# Patient Record
Sex: MALE | Race: ASIAN | NOT HISPANIC OR LATINO | URBAN - METROPOLITAN AREA
[De-identification: names, ages, dates, MRNs, and addresses within clinical notes are randomized per-mention and may not be internally consistent; named-entity substitution may affect disease eponyms.]

---

## 2022-02-14 ENCOUNTER — INPATIENT (INPATIENT)
Facility: HOSPITAL | Age: 47
LOS: 1 days | Discharge: HOME | End: 2022-02-16
Attending: HOSPITALIST | Admitting: HOSPITALIST
Payer: MEDICAID

## 2022-02-14 VITALS
TEMPERATURE: 98 F | SYSTOLIC BLOOD PRESSURE: 123 MMHG | RESPIRATION RATE: 18 BRPM | OXYGEN SATURATION: 100 % | HEART RATE: 76 BPM | DIASTOLIC BLOOD PRESSURE: 89 MMHG

## 2022-02-14 LAB
ALBUMIN SERPL ELPH-MCNC: 4.3 G/DL — SIGNIFICANT CHANGE UP (ref 3.3–5)
ALP SERPL-CCNC: 55 U/L — SIGNIFICANT CHANGE UP (ref 40–120)
ALT FLD-CCNC: 37 U/L — SIGNIFICANT CHANGE UP (ref 4–41)
ANION GAP SERPL CALC-SCNC: 9 MMOL/L — SIGNIFICANT CHANGE UP (ref 7–14)
AST SERPL-CCNC: 28 U/L — SIGNIFICANT CHANGE UP (ref 4–40)
BASOPHILS # BLD AUTO: 0.02 K/UL — SIGNIFICANT CHANGE UP (ref 0–0.2)
BASOPHILS NFR BLD AUTO: 0.3 % — SIGNIFICANT CHANGE UP (ref 0–2)
BILIRUB SERPL-MCNC: 0.3 MG/DL — SIGNIFICANT CHANGE UP (ref 0.2–1.2)
BUN SERPL-MCNC: 15 MG/DL — SIGNIFICANT CHANGE UP (ref 7–23)
CALCIUM SERPL-MCNC: 9 MG/DL — SIGNIFICANT CHANGE UP (ref 8.4–10.5)
CHLORIDE SERPL-SCNC: 104 MMOL/L — SIGNIFICANT CHANGE UP (ref 98–107)
CO2 SERPL-SCNC: 26 MMOL/L — SIGNIFICANT CHANGE UP (ref 22–31)
CREAT SERPL-MCNC: 0.75 MG/DL — SIGNIFICANT CHANGE UP (ref 0.5–1.3)
EOSINOPHIL # BLD AUTO: 0.06 K/UL — SIGNIFICANT CHANGE UP (ref 0–0.5)
EOSINOPHIL NFR BLD AUTO: 1 % — SIGNIFICANT CHANGE UP (ref 0–6)
GLUCOSE SERPL-MCNC: 88 MG/DL — SIGNIFICANT CHANGE UP (ref 70–99)
HCT VFR BLD CALC: 44.7 % — SIGNIFICANT CHANGE UP (ref 39–50)
HGB BLD-MCNC: 15.7 G/DL — SIGNIFICANT CHANGE UP (ref 13–17)
IANC: 2.96 K/UL — SIGNIFICANT CHANGE UP (ref 1.5–8.5)
IMM GRANULOCYTES NFR BLD AUTO: 0.3 % — SIGNIFICANT CHANGE UP (ref 0–1.5)
LYMPHOCYTES # BLD AUTO: 2.41 K/UL — SIGNIFICANT CHANGE UP (ref 1–3.3)
LYMPHOCYTES # BLD AUTO: 39.1 % — SIGNIFICANT CHANGE UP (ref 13–44)
MCHC RBC-ENTMCNC: 30.4 PG — SIGNIFICANT CHANGE UP (ref 27–34)
MCHC RBC-ENTMCNC: 35.1 GM/DL — SIGNIFICANT CHANGE UP (ref 32–36)
MCV RBC AUTO: 86.5 FL — SIGNIFICANT CHANGE UP (ref 80–100)
MONOCYTES # BLD AUTO: 0.69 K/UL — SIGNIFICANT CHANGE UP (ref 0–0.9)
MONOCYTES NFR BLD AUTO: 11.2 % — SIGNIFICANT CHANGE UP (ref 2–14)
NEUTROPHILS # BLD AUTO: 2.96 K/UL — SIGNIFICANT CHANGE UP (ref 1.8–7.4)
NEUTROPHILS NFR BLD AUTO: 48.1 % — SIGNIFICANT CHANGE UP (ref 43–77)
NRBC # BLD: 0 /100 WBCS — SIGNIFICANT CHANGE UP
NRBC # FLD: 0 K/UL — SIGNIFICANT CHANGE UP
PLATELET # BLD AUTO: 216 K/UL — SIGNIFICANT CHANGE UP (ref 150–400)
POTASSIUM SERPL-MCNC: 3.9 MMOL/L — SIGNIFICANT CHANGE UP (ref 3.5–5.3)
POTASSIUM SERPL-SCNC: 3.9 MMOL/L — SIGNIFICANT CHANGE UP (ref 3.5–5.3)
PROT SERPL-MCNC: 6.8 G/DL — SIGNIFICANT CHANGE UP (ref 6–8.3)
RBC # BLD: 5.17 M/UL — SIGNIFICANT CHANGE UP (ref 4.2–5.8)
RBC # FLD: 11.6 % — SIGNIFICANT CHANGE UP (ref 10.3–14.5)
SODIUM SERPL-SCNC: 139 MMOL/L — SIGNIFICANT CHANGE UP (ref 135–145)
WBC # BLD: 6.16 K/UL — SIGNIFICANT CHANGE UP (ref 3.8–10.5)
WBC # FLD AUTO: 6.16 K/UL — SIGNIFICANT CHANGE UP (ref 3.8–10.5)

## 2022-02-14 PROCEDURE — 93010 ELECTROCARDIOGRAM REPORT: CPT

## 2022-02-14 PROCEDURE — 99285 EMERGENCY DEPT VISIT HI MDM: CPT | Mod: 25

## 2022-02-14 RX ORDER — DEXAMETHASONE 0.5 MG/5ML
10 ELIXIR ORAL ONCE
Refills: 0 | Status: COMPLETED | OUTPATIENT
Start: 2022-02-14 | End: 2022-02-14

## 2022-02-14 RX ORDER — DEXAMETHASONE 0.5 MG/5ML
10 ELIXIR ORAL ONCE
Refills: 0 | Status: DISCONTINUED | OUTPATIENT
Start: 2022-02-14 | End: 2022-02-14

## 2022-02-14 RX ORDER — MORPHINE SULFATE 50 MG/1
4 CAPSULE, EXTENDED RELEASE ORAL ONCE
Refills: 0 | Status: DISCONTINUED | OUTPATIENT
Start: 2022-02-14 | End: 2022-02-14

## 2022-02-14 RX ADMIN — Medication 10 MILLIGRAM(S): at 23:56

## 2022-02-14 RX ADMIN — MORPHINE SULFATE 4 MILLIGRAM(S): 50 CAPSULE, EXTENDED RELEASE ORAL at 21:47

## 2022-02-14 RX ADMIN — Medication 102 MILLIGRAM(S): at 22:47

## 2022-02-14 RX ADMIN — MORPHINE SULFATE 4 MILLIGRAM(S): 50 CAPSULE, EXTENDED RELEASE ORAL at 22:15

## 2022-02-14 NOTE — ED PROVIDER NOTE - OBJECTIVE STATEMENT
Dr Durbin  47yoM hx of chronic back pain pw lower leg weakness, right greater than left. pt endorse lower back pain x one week, taking meds at home without relief. Today went to see his neurologist, Dr Rosenbaum.   Dr Rosenbaum, with pt now, states he used lidocaine for a trigger injection. Injected on the left paraspinal area at 430pm, within ten min he developed weakness and numbness from the waist down. +weakness and numbness of both lower ext, unable to stand, Dr Rosenbaum helped pt. in the next two hours, the left leg improved. However the right leg continued to be weak and numb. Came to ED. no fever no trauma no n/v/d no abdominal pain no urinary or bowel incontinence Dr Durbin  47yoM hx of chronic back pain pw lower leg weakness, right greater than left. pt endorse lower back pain x one week, taking meds at home without relief. Today went to see his neurologist, Dr Rosenbaum.   Dr Rosenbaum, with pt now, states he used lidocaine for a trigger injection. Injected on the left paraspinal area at 430pm, within ten min he developed weakness and numbness from the waist down. +weakness and numbness of both lower ext, unable to stand, Dr Rosenbaum helped pt. in the next two hours, the left leg improved. However the right leg continued to be weak and numb. Came to ED. no fever no trauma no n/v/d no abdominal pain no urinary or bowel incontinence    Alexander PGY3: 48yo male with no pmh presenting with LE weakness s/p trigger point injection today.  Patient was at neurologists office for lbp for which he gets injections.  Dr. Rosenbaum, patient's neurologist at bedside.  Performed L paraspinal trigger point injection and shortly after patient began experiencing b/l LE weakness/ numbness.  Since procedure at 430p LLE with improvement in strength but RLE still weak/ numb.  Also notes difficulty urinating but not incontinent.  No fevers.

## 2022-02-14 NOTE — ED ADULT NURSE NOTE - OBJECTIVE STATEMENT
Pt. is a 47 y.o male who presents w/ c/o pain and weakness. Pt. A&Ox4 and ambulates independently at baseline. Currently unable to walk due to pain. Pt. states he had a sudden onset of low back pain w/ radiation to the rt. leg. Saw his neurologist and received a non-steroid injection in the low back. Endorsing 10/10 pain. MD aware. Limited ROM in rt. leg. Full ROM in lt. leg. #20g IV placed to rt. AC. Labs sent. Comfort measures provided, safety measures implemented, call bell in reach.

## 2022-02-14 NOTE — ED PROVIDER NOTE - PHYSICAL EXAMINATION
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   no work of breathing.   no midline spine tenderness along thoracic or lumbar spine. no swelling or discharge noted at injection site along the left paraspinal area.   soft nontender abdomen. no  rebound. no guarding. no sign of trauma. no CVAT rectal temp w RN and Dr Munoz, temp 97 no saddle anesthesia   nl sensation bl lower ext  dec strength of the right leg Dr Durbin  pt laying down no distress, nontoxic appearing male.   no work of breathing.   no midline spine tenderness along thoracic or lumbar spine. no swelling or discharge noted at injection site along the left paraspinal area.   soft nontender abdomen. no  rebound. no guarding. no sign of trauma. no CVAT rectal temp w RN and Dr Munoz, temp 97 no saddle anesthesia   nl sensation bl lower ext  dec strength of the right leg    General appearance: NAD, conversant, afebrile    Neck: Trachea midline; Full range of motion, supple   Pulm: CTA bl, normal respiratory effort and no intercostal retractions, normal work of breathing   CV: RRR, No murmurs, rubs, or gallops   Abdomen: Soft, non-tender, non-distended; no guarding or rebound   Back: + Lumbar midline tenderness   Extremities: No peripheral edema, RLE 3/5, LLE 5/5, gross sensation intact   Skin: Dry, normal temperature, turgor and texture; no rash   Psych: Appropriate affect, cooperative; alert and oriented to person, place and time   Rectal: Diminished rectal tone, no saddle anesthesia

## 2022-02-14 NOTE — ED PROVIDER NOTE - CARE PLAN
Principal Discharge DX:	Right leg numbness  Secondary Diagnosis:	Right leg weakness  Secondary Diagnosis:	Urinary retention   1

## 2022-02-14 NOTE — ED ADULT TRIAGE NOTE - CHIEF COMPLAINT QUOTE
Pt arrives with neurologist at bedside, c/o right leg weakness. As per EMS pt c/o back pain since wednesday. Received lidocaine shot at neurologists office today at 1640 developed rt leg numbness/weakness with difficulty urinating. Denies fever, chills, chest pain. GARRETT called for eval no code stroke called. Pt arrives with neurologist at bedside, c/o right leg weakness. As per EMS pt c/o back pain since wednesday. Received lidocaine shot on left side at neurologists office today at [facility] developed rt leg numbness/weakness with difficulty urinating. Denies fever, chills, chest pain. GARRETT called for eval no code stroke called.

## 2022-02-14 NOTE — ED PROVIDER NOTE - PROGRESS NOTE DETAILS
Alexander PGY3: Concern for cord compression.  Called MR to expedite. Dr Rosenbaum phone [phone] pt stable, pending MRI to be done. sign out to night team Rec'd signout on this pt from Dr. Lynn Durbin:  48yo M p/w LE weakness after outpt ?lidocaine trigger injection for L paraspinal pain performed by his neurologist, awaiting MRI and neurosurg consult. At time of signout, no bladder scan performed. Asked pt if he has voided and pt stated he was able to urinate just minutes ago. Performed post-void residual bedside scan and pt has approx 500cc urine in bladder, will order borges cath. Given this finding and objective RLE weakness - high suspicion for cord compression.  -Dr. Yates Resident Jay: preliminary eval without any MR evidence of cord compression, however pt with persistence of weakness and numbness, inability to ambulate, and new onset urinary retention (borges placed 800 cc PVR). Neurosurgery made aware of updated findings of MR - recommends Medicine admission and will follow along inpatient. Do not suspect central neurologic cause of neuro deficit (lack of any bulbar symptoms, no dysarthria, otherwise CN2-12, EOMI, PERRL) and history of spinal injection with subsequent manifestation of numbness/tingling/weakness - iatrogenic cause is more likely than CVA/TIA. Case endorsed to Hospitalist (MD Golria).

## 2022-02-14 NOTE — ED ADULT NURSE NOTE - CHIEF COMPLAINT QUOTE
Pt arrives with neurologist at bedside, c/o right leg weakness. As per EMS pt c/o back pain since wednesday. Received lidocaine shot on left side at neurologists office today at [facility] developed rt leg numbness/weakness with difficulty urinating. Denies fever, chills, chest pain. GARRETT called for eval no code stroke called.

## 2022-02-15 DIAGNOSIS — Z29.9 ENCOUNTER FOR PROPHYLACTIC MEASURES, UNSPECIFIED: ICD-10-CM

## 2022-02-15 DIAGNOSIS — R20.0 ANESTHESIA OF SKIN: ICD-10-CM

## 2022-02-15 DIAGNOSIS — M54.16 RADICULOPATHY, LUMBAR REGION: ICD-10-CM

## 2022-02-15 DIAGNOSIS — R33.9 RETENTION OF URINE, UNSPECIFIED: ICD-10-CM

## 2022-02-15 DIAGNOSIS — I95.9 HYPOTENSION, UNSPECIFIED: ICD-10-CM

## 2022-02-15 LAB
FLUAV AG NPH QL: SIGNIFICANT CHANGE UP
FLUBV AG NPH QL: SIGNIFICANT CHANGE UP
RSV RNA NPH QL NAA+NON-PROBE: SIGNIFICANT CHANGE UP
SARS-COV-2 RNA SPEC QL NAA+PROBE: SIGNIFICANT CHANGE UP

## 2022-02-15 PROCEDURE — 72147 MRI CHEST SPINE W/DYE: CPT | Mod: 26,MG

## 2022-02-15 PROCEDURE — 72142 MRI NECK SPINE W/DYE: CPT | Mod: 26,MG

## 2022-02-15 PROCEDURE — 72149 MRI LUMBAR SPINE W/DYE: CPT | Mod: 26,MG

## 2022-02-15 PROCEDURE — 99223 1ST HOSP IP/OBS HIGH 75: CPT

## 2022-02-15 PROCEDURE — G1004: CPT

## 2022-02-15 RX ORDER — PANTOPRAZOLE SODIUM 20 MG/1
40 TABLET, DELAYED RELEASE ORAL
Refills: 0 | Status: DISCONTINUED | OUTPATIENT
Start: 2022-02-15 | End: 2022-02-16

## 2022-02-15 RX ORDER — HEPARIN SODIUM 5000 [USP'U]/ML
5000 INJECTION INTRAVENOUS; SUBCUTANEOUS EVERY 12 HOURS
Refills: 0 | Status: DISCONTINUED | OUTPATIENT
Start: 2022-02-15 | End: 2022-02-15

## 2022-02-15 RX ORDER — DEXAMETHASONE 0.5 MG/5ML
4 ELIXIR ORAL EVERY 6 HOURS
Refills: 0 | Status: DISCONTINUED | OUTPATIENT
Start: 2022-02-15 | End: 2022-02-16

## 2022-02-15 RX ORDER — KETOROLAC TROMETHAMINE 30 MG/ML
15 SYRINGE (ML) INJECTION EVERY 6 HOURS
Refills: 0 | Status: DISCONTINUED | OUTPATIENT
Start: 2022-02-15 | End: 2022-02-15

## 2022-02-15 RX ORDER — MORPHINE SULFATE 50 MG/1
2 CAPSULE, EXTENDED RELEASE ORAL EVERY 4 HOURS
Refills: 0 | Status: DISCONTINUED | OUTPATIENT
Start: 2022-02-15 | End: 2022-02-15

## 2022-02-15 RX ORDER — GABAPENTIN 400 MG/1
300 CAPSULE ORAL THREE TIMES A DAY
Refills: 0 | Status: DISCONTINUED | OUTPATIENT
Start: 2022-02-15 | End: 2022-02-16

## 2022-02-15 RX ORDER — ACETAMINOPHEN 500 MG
650 TABLET ORAL EVERY 6 HOURS
Refills: 0 | Status: DISCONTINUED | OUTPATIENT
Start: 2022-02-15 | End: 2022-02-16

## 2022-02-15 RX ORDER — OXYCODONE HYDROCHLORIDE 5 MG/1
5 TABLET ORAL EVERY 6 HOURS
Refills: 0 | Status: DISCONTINUED | OUTPATIENT
Start: 2022-02-15 | End: 2022-02-16

## 2022-02-15 RX ORDER — SODIUM CHLORIDE 9 MG/ML
1000 INJECTION INTRAMUSCULAR; INTRAVENOUS; SUBCUTANEOUS ONCE
Refills: 0 | Status: COMPLETED | OUTPATIENT
Start: 2022-02-15 | End: 2022-02-15

## 2022-02-15 RX ORDER — SODIUM CHLORIDE 9 MG/ML
1000 INJECTION INTRAMUSCULAR; INTRAVENOUS; SUBCUTANEOUS ONCE
Refills: 0 | Status: DISCONTINUED | OUTPATIENT
Start: 2022-02-15 | End: 2022-02-15

## 2022-02-15 RX ADMIN — SODIUM CHLORIDE 1000 MILLILITER(S): 9 INJECTION INTRAMUSCULAR; INTRAVENOUS; SUBCUTANEOUS at 05:54

## 2022-02-15 RX ADMIN — PANTOPRAZOLE SODIUM 40 MILLIGRAM(S): 20 TABLET, DELAYED RELEASE ORAL at 13:56

## 2022-02-15 RX ADMIN — MORPHINE SULFATE 2 MILLIGRAM(S): 50 CAPSULE, EXTENDED RELEASE ORAL at 12:27

## 2022-02-15 RX ADMIN — Medication 4 MILLIGRAM(S): at 17:16

## 2022-02-15 RX ADMIN — Medication 4 MILLIGRAM(S): at 13:56

## 2022-02-15 RX ADMIN — MORPHINE SULFATE 2 MILLIGRAM(S): 50 CAPSULE, EXTENDED RELEASE ORAL at 12:12

## 2022-02-15 RX ADMIN — GABAPENTIN 300 MILLIGRAM(S): 400 CAPSULE ORAL at 21:19

## 2022-02-15 RX ADMIN — OXYCODONE HYDROCHLORIDE 5 MILLIGRAM(S): 5 TABLET ORAL at 21:00

## 2022-02-15 RX ADMIN — OXYCODONE HYDROCHLORIDE 5 MILLIGRAM(S): 5 TABLET ORAL at 20:09

## 2022-02-15 RX ADMIN — SODIUM CHLORIDE 1000 MILLILITER(S): 9 INJECTION INTRAMUSCULAR; INTRAVENOUS; SUBCUTANEOUS at 04:49

## 2022-02-15 RX ADMIN — GABAPENTIN 300 MILLIGRAM(S): 400 CAPSULE ORAL at 12:12

## 2022-02-15 RX ADMIN — Medication 4 MILLIGRAM(S): at 23:53

## 2022-02-15 NOTE — H&P ADULT - NEGATIVE ENMT SYMPTOMS
no hearing difficulty/no ear pain/no tinnitus/no vertigo/no nasal congestion/no dry mouth/no throat pain/no dysphagia

## 2022-02-15 NOTE — H&P ADULT - ATTENDING COMMENTS
47M with hx of chronic lower back pain was brought to the ED by EMS along side his neurologist presenting with constant severe left lower back pain with numbness and weakness from the waist down and urinary retention s/p lidocaine injection in left lower back by neurologist yesterday. Patient's symptoms started about 10 minutes after lidocaine injection. Patient seen and examined, [facility]  [id number]. Patient states that his numbness and weakness is much improved now but is still concerned about his severe back pain. States this is the same back pain that he has been having for the past 4-5 years, only relieved after injections. Explained that his MRI spine did not show cord compression and he does not require surgery. Discussed that neurosurgery recommended outpatient follow-up, explained we will provide numbers for follow-up on discharged. Discussed that there is no cure for his chronic pain that will be provided during this hospital stay. Explained that he came to the hospital with acute issues of numbness and urinary retention. Discussed plan for PT/OT and discontinue borges.    Exam: VSS, Well appearing. Heart RRR, no murmurs. Lungs CTAB. Abd soft, non-tender. LLE with sensation intact, strength 5/5. RLE with sensation intact, however strength 2-3/5 due to back pain.     Labs reviewed  MRI results reviewed     In regards to RLE numbness, this was likely in acute setting from lidocaine injection and has since resolved. MRI does not show cord compression, but does show multilevel cervical and lumbar spondylosis with associated foraminal stenosis. C5-C6 and C6-C7 mild spinal stenosis with mild cord flattening. L3-L4 right paracentral extruded disc herniation and L4-L5 central disc protrusion. Patient was evaluated by neurosx and no inpatient intervention is planned. Will follow-up to see if medrol dose marc can assist in pain/symptom control of chronic LBP for now. Encouraged outpatient follow-up with neuro and neurosx. Will have PT eval patient. Pain control w/ toradol, gabapentin, and oxy PRN severe pain.   In regards to urinary retention, this was also likely in acute setting after lidocaine injection. Will attempt TOV once patient is able to walk/get OOB.     Discussed plan with patient and ACP Kilo

## 2022-02-15 NOTE — H&P ADULT - NEGATIVE GASTROINTESTINAL SYMPTOMS
no nausea/no vomiting/no diarrhea/no constipation/no change in bowel habits/no abdominal pain no nausea/no vomiting/no diarrhea/no constipation/no change in bowel habits/no abdominal pain/no melena/no hematochezia

## 2022-02-15 NOTE — H&P ADULT - NEUROLOGICAL SYMPTOMS
weakness/loss of sensation/difficulty walking weakness/paresthesias/loss of sensation/difficulty walking

## 2022-02-15 NOTE — H&P ADULT - PROBLEM SELECTOR PLAN 3
- BP soft in ED to 98/60  - no reported symptoms  - no hx of hypertension and on no meds  - monitor vitals q4h  - s/p 1L NS bolus in ED; patient hungry and has not eating or drank anything   - encourage oral intake

## 2022-02-15 NOTE — ED ADULT NURSE REASSESSMENT NOTE - NS ED NURSE REASSESS COMMENT FT1
14 fr Indwelling borges placed per MD orders due to neurogenic bladder. 700 ml of urine output noted.

## 2022-02-15 NOTE — CONSULT NOTE ADULT - ASSESSMENT
47M s/p trigger point injection for chronic LBP r/o cord compression    - please obtain stat MRI C/T/Ls

## 2022-02-15 NOTE — H&P ADULT - NSHPPHYSICALEXAM_GEN_ALL_CORE
Vital Signs Last 24 Hrs  T(C): 36.9 (15 Feb 2022 12:15), Max: 36.9 (15 Feb 2022 12:15)  T(F): 98.4 (15 Feb 2022 12:15), Max: 98.4 (15 Feb 2022 12:15)  HR: 88 (15 Feb 2022 12:15) (62 - 88)  BP: 105/60 (15 Feb 2022 12:15) (96/54 - 129/82)  BP(mean): --  RR: 17 (15 Feb 2022 12:15) (17 - 18)  SpO2: 98% (15 Feb 2022 12:15) (97% - 100%)

## 2022-02-15 NOTE — H&P ADULT - NSVTERISKASSESS_GEN_ALL_CORE FT
Medical Assessment Completed on: 15-Feb-2022 09:46 Medical Assessment Completed on: 15-Feb-2022 11:53

## 2022-02-15 NOTE — H&P ADULT - PROBLEM SELECTOR PLAN 2
-DVT PPx: SC Lovenox 40mg q24hr - Likely in setting of lumbar disc radiculopathy   - s/p jony in ED   - no acute neurosurgical intervention as discussed above  - will try TOV tomorrow - Likely in setting of lumbar disc radiculopathy   - s/p jony in ED   - no acute neurosurgical intervention as discussed above  - will try TOV today if patient able to get up and move around with PT

## 2022-02-15 NOTE — H&P ADULT - NEGATIVE GENERAL GENITOURINARY SYMPTOMS
no hematuria/no flank pain L/no urine discoloration/no dysuria no hematuria/no flank pain L/no flank pain R/no urine discoloration/no incontinence/no dysuria

## 2022-02-15 NOTE — CHART NOTE - NSCHARTNOTEFT_GEN_A_CORE
Marixa-PGY3 (GARRETT): called to triage for stroke evaluation. Pt with right sided low back pain with new right leg weakness x 4-5 days with worsening today and new onset difficulty urinating. Code stroke NOT called as likely lumbar radiculopathy or cauda equina syndrome, not ischemic stroke, not a tPA candidate. Pt to be brought to main for further evaluation.    Leonel Calvin, DO  Emergency Medicine PGY3  [id number]
Case and imaging reviewed. No neurosurgical intervention at this time. Neurosurgery signing off. Reconsult PRN. Pt can follow up outpatient with Dr. Lema. Case d/w attending.
Case and imaging reviewed. No neurosurgical intervention at this time. Neurosurgery signing off. Reconsult PRN. Pt can follow up outpatient with Dr. Valdes. Case d/w attending.

## 2022-02-15 NOTE — H&P ADULT - PROBLEM SELECTOR PLAN 1
-lumbar radiculopathy or cauda equina syndrome  -MRI IMPRESSION:  Multilevel cervical and lumbar spondylosis with associated foraminal   stenosis.  C5-C6 and C6-C7 mild spinal stenosis with mild cord flattening.  L3-L4 right paracentral extruded disc herniation and L4-L5 central disc   protrusion as described above.  -Neuro was consulted - Patient states his paresthesias/numbness/weakness from waist down has now completely resolved.   - He reports only problem at present is severe low back pain limiting his ability to walk or even sit up  - MRI done in ED showed L3-L4 right paracentral extruded disc herniation and L4-L5 central disc   protrusion.  - Initial paresthesias and numbness/weakness likely related to trigger point injection as symptom onset was almost immediately following injection  - Persistent chronic low back pain, and chronic RLE weakness that patient reports is likely due to lumbar radiculopathy with herniated disc at L3-L4 on MRI   - Neuro surgery consulted and recommends no acute neurosurgical intervention; and outpatient follow up with Dr. Lema for further management   - While inpatient, will need pain control; will start on tylenol PRN mild, toradol 15mg q6h PRN moderate, and morphine 2mg IV q4h PRN severe for now  - PT eval as patient is quite limited due to pain - Patient states his paresthesias/numbness/weakness from waist down has now completely resolved.   - He reports only problem at present is severe low back pain limiting his ability to walk or even sit up  - MRI done in ED showed L3-L4 right paracentral extruded disc herniation and L4-L5 central disc   protrusion.  - Initial paresthesias and numbness/weakness likely related to trigger point injection as symptom onset was almost immediately following injection  - Persistent chronic low back pain, and chronic RLE weakness that patient reports is likely due to lumbar radiculopathy with herniated disc at L3-L4 on MRI   - Neuro surgery consulted and recommends no acute neurosurgical intervention; and outpatient follow up with Dr. Lema for further management   - While inpatient, will need pain control; will start on tylenol PRN mild, oxy IR 5mg severe PRN   - s/p decadron 10mg IV in ED, discussed with neuro surgery; can continue with 4mg IV q6h IV decadron while admitted for radiculopathy, and plan for discharge on medrol dosepack when stable for discharge   - PT eval as patient is quite limited due to pain

## 2022-02-15 NOTE — H&P ADULT - RS GEN PE MLT RESP DETAILS PC
airway patent/breath sounds equal/good air movement/respirations non-labored/clear to auscultation bilaterally/no chest wall tenderness/no intercostal retractions/no rales/no rhonchi/no wheezes/intercostal retractions airway patent/breath sounds equal/good air movement/respirations non-labored/clear to auscultation bilaterally/no chest wall tenderness/no intercostal retractions/no rales/no rhonchi/no wheezes

## 2022-02-15 NOTE — CONSULT NOTE ADULT - SUBJECTIVE AND OBJECTIVE BOX
NEUROSURGERY CONSULT    HPI: 47y Male with chronic LBP follows with neurologist Dr. Solorio went for trigger point injection today. He got a trigger point injection in left paraspinal area of lumbar region and developed le weakness, numbness after injection. L. LE weakness has improved since. per patient he has some R. LE weakness for years but feels its worse today since injection, also reports tingling and numbness in b/l LE. Per ED patient had decreased rectal tone. Per patient no bladder incontinence.    RADIOLOGY:     MEDS:      PHYSICAL EXAM: awake, A&Ox3, fc  perrl, tracts  b/l UE 5/5  L. LE 4+/5  R. LE HF 4/5 Ke 2/5, Kf 2/5 DF/ PF 4/5  SILT    Vital Signs Last 24 Hrs  T(C): 36.1 (14 Feb 2022 21:30), Max: 36.8 (14 Feb 2022 20:41)  T(F): 97 (14 Feb 2022 21:30), Max: 98.2 (14 Feb 2022 20:41)  HR: 78 (14 Feb 2022 21:30) (76 - 78)  BP: 129/82 (14 Feb 2022 21:30) (123/89 - 129/82)  BP(mean): --  RR: 18 (14 Feb 2022 21:30) (18 - 18)  SpO2: 100% (14 Feb 2022 21:30) (100% - 100%)    LABS:                        15.7   6.16  )-----------( 216      ( 14 Feb 2022 22:03 )             44.7     02-14    139  |  104  |  15  ----------------------------<  88  3.9   |  26  |  0.75    Ca    9.0      14 Feb 2022 22:03    TPro  6.8  /  Alb  4.3  /  TBili  0.3  /  DBili  x   /  AST  28  /  ALT  37  /  AlkPhos  55  02-14

## 2022-02-15 NOTE — H&P ADULT - NEUROLOGICAL DETAILS
alert and oriented x 3/responds to pain/responds to verbal commands/sensation intact/no spontaneous movement/strength decreased

## 2022-02-15 NOTE — PATIENT PROFILE ADULT - FALL HARM RISK - HARM RISK INTERVENTIONS
Assistance with ambulation/Assistance OOB with selected safe patient handling equipment/Communicate Risk of Fall with Harm to all staff/Discuss with provider need for PT consult/Monitor gait and stability/Reinforce activity limits and safety measures with patient and family/Tailored Fall Risk Interventions/Visual Cue: Yellow wristband and red socks/Bed in lowest position, wheels locked, appropriate side rails in place/Call bell, personal items and telephone in reach/Instruct patient to call for assistance before getting out of bed or chair/Non-slip footwear when patient is out of bed/[place] to call system/Physically safe environment - no spills, clutter or unnecessary equipment/Purposeful Proactive Rounding/Room/bathroom lighting operational, light cord in reach

## 2022-02-15 NOTE — H&P ADULT - MOTOR
Bilateral upper extremities 5/5 strength  Left lower extremity 4/5 strength  Right lower extremity 3/5 strength Bilateral upper extremities 5/5 strength  Left lower extremity 5/5 strength  Right lower extremity 3/5 strength

## 2022-02-15 NOTE — H&P ADULT - HISTORY OF PRESENT ILLNESS
46y/o male with PMHx of lower back pain was brought to the ED by EMS along side his neurologist presenting with constant. severe left lower back pain with numbness and weakness from the waist down to his feet s/p lidocaine injection in left lower back by neurologist. Pt reports that his lower back pain started 1 week ago, he notes that sexual intercourse is what provokes his lower back pain. Pt rates the pain 10/10 in severity. He has had this lower back pain on and off for the past 4-5 years. His doctor prescribed him PO pain meds, which do not relieve his symptoms. He notes that the only thing that relieves his pain usually is the lidocaine injections he gets at the neurologist's office. He went to the neurologist yesterday to get another injection. He usually feels immediate relief within 2 min of the injection, but this time both his legs became extremely weak and numb. Pt denies any pain in his LE. He was weak from the waist down with no sensation. He was unable to stand or walk due to the weakness. He waited in the neurologists office for about 2 hours for it to subside, but when his status did not improve, his neurologist called the ambulance that took his to [facility] ED. Pt reports that he was unable to urinate yesterday due to the numbness in the area. Pt has never experienced this weakness and numbness in before.  Rodriguez catheter was placed in the hospital. Today, pt notes that his B/L LE weakness has improved and his sensation is now intact. He has not tried to stand or walk on his own yet. At baseline, patient is able to ambulate without needing any assistance. He reports that his left lower back pain still persists, rating it a 10/10 now as well. He received all 3 doses of the Pfizer vaccine, booster dose was administered on 01/17/2022. Pt denies fever, chills, fatigue, HA, syncope, lightheadedness, cough, runny nose, sore throat, visual changes, ear abnormalities, chest pain, SOB, abdominal pain, N/V/D/C, recent travel, and recent exposure to sick contacts.  48y/o male with PMHx of chronic intermittent low back pain was brought to the ED by EMS along side his neurologist presenting with constant severe left lower back pain with numbness and weakness from the waist down to his feet s/p lidocaine injection in left lower back by neurologist yesterday around 4:30pm. Pt reports that his current lower back pain flare started 1 week ago. Pt rates the pain 10/10 in severity. He has had this lower back pain on and off for the past 4-5 years. He states it is intermittent and non - radiating, made worse by lifting his legs up and moving around in bed or trying to get up to go the bathroom. His doctor prescribed him medications which do not relieve his symptoms. He notes that the only thing that relieves his pain usually is the lidocaine injections he gets at the neurologist's office, which he states he has had 3 - 4 injections over past couple years. He even reports having image guided steroid injection years ago without relief. He went to the neurologist yesterday to get another injection. He usually feels immediate relief within 2 min of the injection, but this time both his legs became extremely weak and numb and he reports losing all feeling from waist down. Pt denies any pain in his LE. He was unable to stand or walk due to the weakness in lower extremities. He waited in the neurologists office for about 2 hours for it to subside, but when his status did not improve, his neurologist called the ambulance that took his to [facility] ED. Pt reports that he was unable to urinate yesterday due to the numbness in the area. Pt has never experienced this weakness and numbness in before.  Today, pt notes that his B/L LE weakness has improved to baseline and his sensation is now intact. He reports that his right leg is weak at baseline since all of his back pain issues started. He has not tried to stand or walk on his own yet. At baseline, patient is able to ambulate without needing any assistance. He received all 3 doses of the Pfizer vaccine, booster dose was administered on 01/17/2022. Pt denies fever, chills, fatigue, HA, syncope, lightheadedness, cough, runny nose, sore throat, visual changes, ear abnormalities, chest pain, SOB, abdominal pain, N/V/D/C, recent travel, and recent exposure to sick contacts. In the ED, Rodriguez catheter was placed in the hospital due to urinary retention with 700cc output, and he was noted with diminished rectal tone and decreased strength in RLE. Neuro surgery was called and recommended MRI to R/O compression; MRI did not demonstrate compression and neuro surgery signed off and recommended outpatient follow up.

## 2022-02-15 NOTE — H&P ADULT - NEGATIVE NEUROLOGICAL SYMPTOMS
no paresthesias/no syncope/no tremors/no vertigo/no headache/no loss of consciousness/no confusion no syncope/no tremors/no vertigo/no headache/no loss of consciousness/no confusion

## 2022-02-15 NOTE — H&P ADULT - BACK EXAM
+TTP in left lower lumber region/normal shape/ROM intact/strength intact/ROM limited +TTP around area of injection - band aid is in place, clean dry and intact. Mild edema noted, no bleeding or hematoma noted./normal shape/ROM intact/strength intact/ROM limited

## 2022-02-15 NOTE — H&P ADULT - NSHPSOURCEINFOTX_GEN_ALL_CORE
[de-identified] : Patient is here for left knee pain that began about 2 months ago. He states that over the winter he did not run as much as he would have liked to. In May he ran a 5K and afterwards began to notice pain in the posterior and medial aspects of his knee. He rested and was seeing a chiropractor for ART. He had an MRI done which some some fraying of the meniscus. He has had improvement of his pain with rest and ART. He has not taken medications for pain. He will occasionally continue to feel pain in the posterior aspect of his knee.  Denies N/T/R/Prior injury. 
Patient is a reliable source of information, AAOx3

## 2022-02-15 NOTE — H&P ADULT - PROBLEM SELECTOR PLAN 4
- DVT PPx: Heparin SC 5000 q12h ppx while admitted  - Case and plan to be discussed with Dr. Ayala - DVT PPx: SCD's for now given recent procedure   - Case and plan discussed with Dr. Ayala

## 2022-02-15 NOTE — H&P ADULT - MUSCULOSKELETAL
detailed exam details… stength has improved from yesterday. unable to lift legs up due to lower back pain./no joint swelling/no joint erythema/no joint warmth/no calf tenderness/decreased ROM due to pain/diminished strength stength has improved from yesterday back to baseline per patient. unable to lift legs up (right less so than left) due to lower back pain./ROM intact/no joint swelling/no joint erythema/no joint warmth/no calf tenderness/diminished strength

## 2022-02-15 NOTE — H&P ADULT - NEGATIVE PSYCHIATRIC SYMPTOMS
This patient will need to fail a second generation anti-androgen treatment to be eligible for trials at [facility] at this time  no suicidal ideation/no depression/no anxiety

## 2022-02-15 NOTE — H&P ADULT - NSHPLABSRESULTS_GEN_ALL_CORE
15.7   6.16  )-----------( 216      ( 14 Feb 2022 22:03 )             44.7   02-14    139  |  104  |  15  ----------------------------<  88  3.9   |  26  |  0.75    Ca    9.0      14 Feb 2022 22:03    TPro  6.8  /  Alb  4.3  /  TBili  0.3  /  DBili  x   /  AST  28  /  ALT  37  /  AlkPhos  55  02-14    MRI IMPRESSION:  Multilevel cervical and lumbar spondylosis with associated foraminal   stenosis.  C5-C6 and C6-C7 mild spinal stenosis with mild cord flattening.  L3-L4 right paracentral extruded disc herniation and L4-L5 central disc   protrusion as described above.    Covid negative 15.7   6.16  )-----------( 216      ( 14 Feb 2022 22:03 )             44.7   02-14    139  |  104  |  15  ----------------------------<  88  3.9   |  26  |  0.75    Ca    9.0      14 Feb 2022 22:03    TPro  6.8  /  Alb  4.3  /  TBili  0.3  /  DBili  x   /  AST  28  /  ALT  37  /  AlkPhos  55  02-14    Covid/Flu/RSV negative    EKG: NSR @ 66bpm, QTc 398, no acute ischemic changes noted, no prior to compare to     MRI C/T/L spine: Multilevel cervical and lumbar spondylosis with associated foraminal   stenosis. C5-C6 and C6-C7 mild spinal stenosis with mild cord flattening.  L3-L4 right paracentral extruded disc herniation and L4-L5 central disc   protrusion as described above.

## 2022-02-15 NOTE — H&P ADULT - LOCATION OF BACK PAIN
lower left lumbar region/lumbar spine lower left lumbar region around the area of injection/lumbar spine

## 2022-02-15 NOTE — H&P ADULT - ASSESSMENT
46y/o male with PMHx of lower back pain was brought to the Ed by EMS along side his neurologist presenting with constant. severe left lower back pain with numbness and weakness from the waist down to his feet s/p lidocaine injection in left lower back by neurologist 46y/o male with PMHx of chronic lower back pain was brought to the Ed by EMS along side his neurologist presenting with constant severe left lower back pain with numbness and weakness from the waist down to his feet s/p lidocaine injection in left lower back by neurologist yesterday.

## 2022-02-15 NOTE — H&P ADULT - NSHPSOCIALHISTORY_GEN_ALL_CORE
Pt is currently , but  from his wife. He currently lives at home with his cousin in [place]. Pt has not been working for the past few months due to the spike in Covid. He received all 3 doses of the Pfizer vaccine. He denies any drug use. He's been smoking cigarettes for many years now (exact number unknown), about 3-4 cigarettes per day. Pt reports to drinking alcohol only occasionally in social settings.

## 2022-02-15 NOTE — H&P ADULT - NSICDXPASTMEDICALHX_GEN_ALL_CORE_FT
PAST MEDICAL HISTORY:  Lower back pain      PAST MEDICAL HISTORY:  Lower back pain chronic, also reports chronic RLE weakness

## 2022-02-16 VITALS
SYSTOLIC BLOOD PRESSURE: 116 MMHG | HEART RATE: 83 BPM | DIASTOLIC BLOOD PRESSURE: 68 MMHG | RESPIRATION RATE: 18 BRPM | OXYGEN SATURATION: 97 % | TEMPERATURE: 98 F

## 2022-02-16 PROCEDURE — 99239 HOSP IP/OBS DSCHRG MGMT >30: CPT

## 2022-02-16 RX ORDER — LIDOCAINE 4 G/100G
0 CREAM TOPICAL
Qty: 0 | Refills: 0 | DISCHARGE

## 2022-02-16 RX ORDER — PANTOPRAZOLE SODIUM 20 MG/1
1 TABLET, DELAYED RELEASE ORAL
Qty: 7 | Refills: 0
Start: 2022-02-16 | End: 2022-02-22

## 2022-02-16 RX ORDER — GABAPENTIN 400 MG/1
0 CAPSULE ORAL
Qty: 0 | Refills: 0 | DISCHARGE

## 2022-02-16 RX ORDER — ACETAMINOPHEN 500 MG
2 TABLET ORAL
Qty: 0 | Refills: 0 | DISCHARGE
Start: 2022-02-16

## 2022-02-16 RX ORDER — IBUPROFEN 200 MG
1 TABLET ORAL
Qty: 0 | Refills: 0 | DISCHARGE

## 2022-02-16 RX ORDER — GABAPENTIN 400 MG/1
1 CAPSULE ORAL
Qty: 0 | Refills: 0 | DISCHARGE
Start: 2022-02-16

## 2022-02-16 RX ORDER — GABAPENTIN 400 MG/1
1 CAPSULE ORAL
Qty: 0 | Refills: 0 | DISCHARGE

## 2022-02-16 RX ADMIN — Medication 4 MILLIGRAM(S): at 11:24

## 2022-02-16 RX ADMIN — PANTOPRAZOLE SODIUM 40 MILLIGRAM(S): 20 TABLET, DELAYED RELEASE ORAL at 06:55

## 2022-02-16 RX ADMIN — GABAPENTIN 300 MILLIGRAM(S): 400 CAPSULE ORAL at 08:54

## 2022-02-16 RX ADMIN — Medication 4 MILLIGRAM(S): at 06:55

## 2022-02-16 NOTE — PHYSICAL THERAPY INITIAL EVALUATION ADULT - ADDITIONAL COMMENTS
Pt. reports his numbness and weakness has resolved.     Pt. was left seated at edge of bed post PT Evaluation, no apparent distress, call bell within reach.

## 2022-02-16 NOTE — DISCHARGE NOTE PROVIDER - PROVIDER TOKENS
PROVIDER:[TOKEN:[[id number]]],FREE:[LAST:[Neurologist],PHONE:[phone]],FAX:[(   )    -],ADDRESS:[You can follow up with your neurologist or follow up with [facility] Neurology clinic at [address] [place] [address].]]

## 2022-02-16 NOTE — PROGRESS NOTE ADULT - PROBLEM SELECTOR PLAN 3
- BP soft in ED to 98/60  - no reported symptoms  - no hx of hypertension and on no meds  - monitor vitals q4h  - s/p 1L NS bolus in ED; patient hungry and has not eating or drank anything   - encourage oral intake - BP soft in ED to 98/60, now resolved   - no reported symptoms  - no hx of hypertension and on no meds  - monitor vitals q4h  - s/p 1L NS bolus in ED;   - encourage oral intake

## 2022-02-16 NOTE — DISCHARGE NOTE PROVIDER - NSDCFUADDAPPT_GEN_ALL_CORE_FT
Please follow up with your primary care provider within 1 week of discharge from the hospital. If you do not have a primary care provider please follow up with the [facility], call [phone]

## 2022-02-16 NOTE — PROGRESS NOTE ADULT - PROBLEM SELECTOR PLAN 2
- Likely in setting of lumbar disc radiculopathy   - s/p jony in ED   - no acute neurosurgical intervention as discussed above  - will try TOV today if patient able to get up and move around with PT - Likely in setting of lumbar disc radiculopathy   - s/p borges in ED   - no acute neurosurgical intervention as discussed above  - s/p  TOV , successful, Borges removed

## 2022-02-16 NOTE — DISCHARGE NOTE PROVIDER - NSDCCPCAREPLAN_GEN_ALL_CORE_FT
PRINCIPAL DISCHARGE DIAGNOSIS  Diagnosis: Right leg numbness  Assessment and Plan of Treatment: - MRI lumbar spine: Multilevel cervical and lumbar spondylosis with associated foraminal stenosis. C5-C6 and C6-C7 mild spinal stenosis with mild cord flattening. L3-L4 right paracentral extruded disc herniation and L4-L5 central disc protrusion.   - Neuro surgery consulted and recommends no acute neurosurgical intervention; and outpatient follow up with Dr. Lema for further management   - Complete steroids as prescribed.  - Follow up with Dr. Lema within 2 weeks of discharge from the hospital.      SECONDARY DISCHARGE DIAGNOSES  Diagnosis: Urinary retention  Assessment and Plan of Treatment: A borges was placed in the ED for urinary retention likely in setting of lumbar disc radiculopathy. Borges removed. Continue your medications as directed and please follow-up as an outpatient with your primary care provider for further care and recommendations.    Diagnosis: Low blood pressure  Assessment and Plan of Treatment: You had low blood pressure on admission. You report not hisotyr of hypertension and on no blood pressure medications. Likley due to low oral intake due to pain. Blood pressure improved with fluids. Please stay well hydtrated.   Please follow up with your primary care provider within 1 week of discharge from the hospital. If you do not have a primary care provider please follow up with the [facility], call [phone].

## 2022-02-16 NOTE — PHYSICAL THERAPY INITIAL EVALUATION ADULT - DISCHARGE DISPOSITION, PT EVAL
Home with Outpatient Physical Therapy. Pt. is independent with functional mobility, PT not indicated with inpatient. Pt. will be discharged from PT program.

## 2022-02-16 NOTE — DISCHARGE NOTE NURSING/CASE MANAGEMENT/SOCIAL WORK - PATIENT PORTAL LINK FT
You can access the FollowMyHealth Patient Portal offered by [facility] by registering at the following website: [email or web address]. By joining [facility]’s FollowMyHealth portal, you will also be able to view your health information using other applications (apps) compatible with our system.

## 2022-02-16 NOTE — DISCHARGE NOTE PROVIDER - CARE PROVIDER_API CALL
Kyung Lema)  [facility] Neurosurgery  General  [address], [address]  [place] [address]  Phone: [phone]  Fax: [phone]  Follow Up Time:     Neurologist,   You can follow up with your neurologist or follow up with [facility] Neurology clinic at [address] [facility].  Phone: [phone]  Fax: (   )    -  Follow Up Time:

## 2022-02-16 NOTE — PROGRESS NOTE ADULT - PROBLEM SELECTOR PLAN 1
- Patient states his paresthesias/numbness/weakness from waist down has now completely resolved.   - He reports only problem at present is severe low back pain limiting his ability to walk or even sit up  - MRI done in ED showed L3-L4 right paracentral extruded disc herniation and L4-L5 central disc   protrusion.  - Initial paresthesias and numbness/weakness likely related to trigger point injection as symptom onset was almost immediately following injection  - Persistent chronic low back pain, and chronic RLE weakness that patient reports is likely due to lumbar radiculopathy with herniated disc at L3-L4 on MRI   - Neuro surgery consulted and recommends no acute neurosurgical intervention; and outpatient follow up with Dr. Lema for further management   - While inpatient, will need pain control; will start on tylenol PRN mild, oxy IR 5mg severe PRN   - s/p decadron 10mg IV in ED, discussed with neuro surgery; can continue with 4mg IV q6h IV decadron while admitted for radiculopathy, and plan for discharge on medrol dosepack when stable for discharge   - PT eval as patient is quite limited due to pain - Patient states his paresthesias/numbness/weakness from waist down has now completely resolved.   - He reports only problem at present is severe low back pain limiting his ability to walk or even sit up  - MRI done in ED showed L3-L4 right paracentral extruded disc herniation and L4-L5 central disc   protrusion.  - Initial paresthesias and numbness/weakness likely related to trigger point injection as symptom onset was almost immediately following injection  - Persistent chronic low back pain, and chronic RLE weakness that patient reports is likely due to lumbar radiculopathy with herniated disc at L3-L4 on MRI   - Neuro surgery consulted and recommends no acute neurosurgical intervention; and outpatient follow up with Dr. Lema for further management   - While inpatient, will need pain control;  started  on Tylenol PRN mild, oxy IR 5mg severe PRN   - s/p decadron 10mg IV in ED, As per discussion  with neuro surgery; can continue with 4mg IV q6h IV decadron while admitted for radiculopathy, and plan for discharge on medrol dose pack when stable for discharge   - PT eval; Outpt PT

## 2022-02-16 NOTE — DISCHARGE NOTE NURSING/CASE MANAGEMENT/SOCIAL WORK - NSDCPEFALRISK_GEN_ALL_CORE
For information on Fall & Injury Prevention, visit: [email or web address] OR  [email or web address] OR  https://www.cdc.gov/steadi/patient.html

## 2022-02-16 NOTE — DISCHARGE NOTE NURSING/CASE MANAGEMENT/SOCIAL WORK - NSDCFUADDAPPT_GEN_ALL_CORE_FT
Please follow up with your primary care provider within 1 week of discharge from the hospital. If you do not have a primary care provider please follow up with the [facility], call [phone]

## 2022-02-16 NOTE — PROGRESS NOTE ADULT - ASSESSMENT
48y/o male with PMHx of chronic lower back pain was brought to the Ed by EMS along side his neurologist presenting with constant severe left lower back pain with numbness and weakness from the waist down to his feet s/p lidocaine injection in left lower back by neurologist yesterday.

## 2022-02-16 NOTE — PHYSICAL THERAPY INITIAL EVALUATION ADULT - LIVES WITH, PROFILE
Pt received supine in bed A & O x 4, pt in NAD, spouse and sister in law at b/s. Lives in 2nd floor apartment with cousin. Has stairs.

## 2022-02-16 NOTE — DISCHARGE NOTE PROVIDER - HOSPITAL COURSE
48y/o male with PMHx of chronic lower back pain was brought to the Ed by EMS along side his neurologist presenting with constant severe left lower back pain with numbness and weakness from the waist down to his feet s/p lidocaine injection in left lower back by neurologist yesterday.    Lumbar radiculopathy   - Patient states his paresthesias/numbness/weakness from waist down has now completely resolved.   - He reports only problem at present is severe low back pain limiting his ability to walk or even sit up  - MRI done in ED showed L3-L4 right paracentral extruded disc herniation and L4-L5 central disc   protrusion.  - Initial paresthesias and numbness/weakness likely related to trigger point injection as symptom onset was almost immediately following injection  - Persistent chronic low back pain, and chronic RLE weakness that patient reports is likely due to lumbar radiculopathy with herniated disc at L3-L4 on MRI   - Neuro surgery consulted and recommends no acute neurosurgical intervention; and outpatient follow up with Dr. Lema for further management   - While inpatient, will need pain control; will start on tylenol PRN mild, oxy IR 5mg severe PRN   - s/p decadron 10mg IV in ED, discussed with neuro surgery; can continue with 4mg IV q6h IV decadron while admitted for radiculopathy, and plan for discharge on medrol dosepack when stable for discharge   - PT eval _________________    Urinary retention.  - Likely in setting of lumbar disc radiculopathy   - s/p borges in ED   - no acute neurosurgical intervention as discussed above  - passed trial of void     Low blood pressure.   - BP soft in ED to 98/60, asymptomatic  - no hx of hypertension and on no meds  - s/p 1L NS bolus in ED; patient hungry and has not eating or drank anything   - encourage oral intake.  - improved     Discussed case with  _____________________ on ________________________, patient medically stable for discharge to ___________________. 48y/o male with PMHx of chronic lower back pain was brought to the Ed by EMS along side his neurologist presenting with constant severe left lower back pain with numbness and weakness from the waist down to his feet s/p lidocaine injection in left lower back by neurologist yesterday.    Lumbar radiculopathy   - Patient states his paresthesias/numbness/weakness from waist down has now completely resolved.   - He reports only problem at present is severe low back pain limiting his ability to walk or even sit up  - MRI done in ED showed L3-L4 right paracentral extruded disc herniation and L4-L5 central disc   protrusion.  - Initial paresthesias and numbness/weakness likely related to trigger point injection as symptom onset was almost immediately following injection  - Persistent chronic low back pain, and chronic RLE weakness that patient reports is likely due to lumbar radiculopathy with herniated disc at L3-L4 on MRI   - Neuro surgery consulted and recommends no acute neurosurgical intervention; and outpatient follow up with Dr. Lema for further management   - While inpatient, will need pain control; will start on tylenol PRN mild, oxy IR 5mg severe PRN   - s/p decadron 10mg IV in ED, discussed with neuro surgery; can continue with 4mg IV q6h IV decadron while admitted for radiculopathy, and plan for discharge on medrol dosepack when stable for discharge   - PT eval recs outpatient physical therapy     Urinary retention.  - Likely in setting of lumbar disc radiculopathy   - s/p borges in ED   - no acute neurosurgical intervention as discussed above  - passed trial of void     Low blood pressure.   - BP soft in ED to 98/60, asymptomatic  - no hx of hypertension and on no meds  - s/p 1L NS bolus in ED; patient hungry and has not eating or drank anything   - encourage oral intake.  - improved     Discussed case with Dr. Simeon on 2/16/2022, patient medically stable for discharge to home today.

## 2022-02-16 NOTE — PHYSICAL THERAPY INITIAL EVALUATION ADULT - PERTINENT HX OF CURRENT PROBLEM, REHAB EVAL
Pt. admitted for numbness and weakness from the waist down to his feet s/p lidocaine injection in left lower back by neurologist. Per documentation, MRI done in ED showed L3-L4 right paracentral extruded disc herniation and L4-L5 central disc. No acute neurosurgical intervention.

## 2022-02-16 NOTE — PROGRESS NOTE ADULT - SUBJECTIVE AND OBJECTIVE BOX
Patient is a 47y old  Male who presents with a chief complaint of lower back pain with B/L LE weakness/numbness (16 Feb 2022 09:10)      SUBJECTIVE / OVERNIGHT EVENTS:    MEDICATIONS  (STANDING):  dexAMETHasone  Injectable 4 milliGRAM(s) IV Push every 6 hours  gabapentin 300 milliGRAM(s) Oral three times a day  pantoprazole    Tablet 40 milliGRAM(s) Oral before breakfast    MEDICATIONS  (PRN):  acetaminophen     Tablet .. 650 milliGRAM(s) Oral every 6 hours PRN Mild Pain (1 - 3)  oxyCODONE    IR 5 milliGRAM(s) Oral every 6 hours PRN Severe Pain (7 - 10)      Vital Signs Last 24 Hrs  T(C): 36.8 (16 Feb 2022 06:52), Max: 36.9 (15 Feb 2022 12:15)  T(F): 98.3 (16 Feb 2022 06:52), Max: 98.5 (15 Feb 2022 16:20)  HR: 77 (16 Feb 2022 06:52) (77 - 101)  BP: 105/62 (16 Feb 2022 06:52) (104/59 - 115/62)  BP(mean): --  RR: 18 (16 Feb 2022 06:52) (17 - 18)  SpO2: 95% (16 Feb 2022 06:52) (95% - 98%)  CAPILLARY BLOOD GLUCOSE        I&O's Summary    15 Feb 2022 07:01  -  16 Feb 2022 07:00  --------------------------------------------------------  IN: 0 mL / OUT: 2130 mL / NET: -2130 mL    16 Feb 2022 07:01  -  16 Feb 2022 10:17  --------------------------------------------------------  IN: 0 mL / OUT: 600 mL / NET: -600 mL        PHYSICAL EXAM:  GENERAL: NAD, well-developed  HEAD:  Atraumatic, Normocephalic  EYES: EOMI, PERRLA, conjunctiva and sclera clear  NECK: Supple, No JVD  CHEST/LUNG: Clear to auscultation bilaterally; No wheeze  HEART: Regular rate and rhythm; No murmurs, rubs, or gallops  ABDOMEN: Soft, Nontender, Nondistended; Bowel sounds present  EXTREMITIES:  2+ Peripheral Pulses, No clubbing, cyanosis, or edema  PSYCH: AAOx3  NEUROLOGY: non-focal  SKIN: No rashes or lesions    LABS:                        15.7   6.16  )-----------( 216      ( 14 Feb 2022 22:03 )             44.7     02-14    139  |  104  |  15  ----------------------------<  88  3.9   |  26  |  0.75    Ca    9.0      14 Feb 2022 22:03    TPro  6.8  /  Alb  4.3  /  TBili  0.3  /  DBili  x   /  AST  28  /  ALT  37  /  AlkPhos  55  02-14              RADIOLOGY & ADDITIONAL TESTS:    Imaging Personally Reviewed:    Consultant(s) Notes Reviewed:      Care Discussed with Consultants/Other Providers:   Patient is a 47y old  Male who presents with a chief complaint of lower back pain with B/L LE weakness/numbness (16 Feb 2022 09:10)      SUBJECTIVE / OVERNIGHT EVENTS: patient seen and examined by bedside, pt still  c/o lower back pain but denies numbness and tingling in LE, denies problem with bowel and bladder function, pt able to ambulate without difficulty     MEDICATIONS  (STANDING):  dexAMETHasone  Injectable 4 milliGRAM(s) IV Push every 6 hours  gabapentin 300 milliGRAM(s) Oral three times a day  pantoprazole    Tablet 40 milliGRAM(s) Oral before breakfast    MEDICATIONS  (PRN):  acetaminophen     Tablet .. 650 milliGRAM(s) Oral every 6 hours PRN Mild Pain (1 - 3)  oxyCODONE    IR 5 milliGRAM(s) Oral every 6 hours PRN Severe Pain (7 - 10)      Vital Signs Last 24 Hrs  T(C): 36.8 (16 Feb 2022 06:52), Max: 36.9 (15 Feb 2022 12:15)  T(F): 98.3 (16 Feb 2022 06:52), Max: 98.5 (15 Feb 2022 16:20)  HR: 77 (16 Feb 2022 06:52) (77 - 101)  BP: 105/62 (16 Feb 2022 06:52) (104/59 - 115/62)  BP(mean): --  RR: 18 (16 Feb 2022 06:52) (17 - 18)  SpO2: 95% (16 Feb 2022 06:52) (95% - 98%)  CAPILLARY BLOOD GLUCOSE        I&O's Summary    15 Feb 2022 07:01  -  16 Feb 2022 07:00  --------------------------------------------------------  IN: 0 mL / OUT: 2130 mL / NET: -2130 mL    16 Feb 2022 07:01  -  16 Feb 2022 10:17  --------------------------------------------------------  IN: 0 mL / OUT: 600 mL / NET: -600 mL        PHYSICAL EXAM:  GENERAL: NAD, well-developed  HEAD:  Atraumatic, Normocephalic  EYES: EOMI, PERRLA, conjunctiva and sclera clear  NECK: Supple, No JVD  CHEST/LUNG: Clear to auscultation bilaterally; No wheeze  HEART: Regular rate and rhythm; No murmurs, rubs, or gallops  ABDOMEN: Soft, Nontender, Nondistended; Bowel sounds present  BACK: Normal shape; ROM intact; strength intact; ROM limited; +TTP around area of injection - band aid is in place, clean dry and intact.  EXTREMITIES:  2+ Peripheral Pulses, No clubbing, cyanosis, or edema  PSYCH: AAOx3  NEUROLOGY: non-focal  SKIN: No rashes or lesions    LABS:                        15.7   6.16  )-----------( 216      ( 14 Feb 2022 22:03 )             44.7     02-14    139  |  104  |  15  ----------------------------<  88  3.9   |  26  |  0.75    Ca    9.0      14 Feb 2022 22:03    TPro  6.8  /  Alb  4.3  /  TBili  0.3  /  DBili  x   /  AST  28  /  ALT  37  /  AlkPhos  55  02-14              RADIOLOGY & ADDITIONAL TESTS:  < from: MR Lumbar Spine w/ IV Cont (02.15.22 @ 04:49) >  CERVICAL SPINE:  VERTEBRAL BODIES:  Diffusely decreased T1 signal without associated   enhancement or T2 hyperintensity likely secondary to hematopoietic   marrow. C6-C7 discogenic and plate changes.. Multi level disc desiccation.  ALIGNMENT:  No subluxations. Straightening of the cervical lordosis  INTERVERTEBRALDISC SPACES:  C3-C4 disc bulge/ridge more prominent toward the right with right greater   than left foraminal narrowing  C5-C6 diffuse disc bulge/ridge more prominent toward the right with right   greater than left foraminal narrowing, mild spinal stenosis and mild cord   flattening.  C6-C7 disc bulge/ridge more prominent toward the left with left greater   than right foraminal stenosis, mild spinal stenosis and minimal   left-sided cord flattening.  NEURAL FORAMINA:  Otherwise patent  SPINAL CORD: Otherwise normal in appearance. No abnormal signal.  SPINAL CANAL:  No other intradural or extradural defects are seen. No   abnormal enhancement is seen.  MISCELLANEOUS:  None.    THORACIC SPINE:  VERTEBRAL BODIES:  Normal.  ALIGNMENT:  No subluxations.  INTERVERTEBRAL DISC SPACES:  Normal.  NEURAL FORAMINA:  Normal.  SPINAL CORD: Normal.  SPINAL CANAL:  No other intradural or extradural defects are seen. No   abnormal enhancement is seen.  MISCELLANEOUS:  None.    LUMBAR SPINE:  VERTEBRAL BODIES:  Normal in height. L5-S1 discogenic and plate changes.   L4 inferior endplate Schmorl's node. Otherwise normal in signal intensity.  ALIGNMENT:  No subluxations.  INTERVERTEBRAL DISC SPACES:  L2-L3 mild disc bulge  L3-L4 diffuse disc bulge with superimposed right paracentral extruded   disc herniation with inferior migration producing effacement of the   thecal sac and abutting the right L4 nerve root within the lateral recess.  L4-L5 diffuse disc bulge with central disc protrusion producing mild   canal narrowing. Mild left foraminal narrowing.  L5-S1 diffuse disc bulge with bilateral foraminal narrowing.  NEURAL FORAMINA:  Otherwise patent  CONUS MEDULLARIS: Normal.  SPINAL CANAL:  No other intradural or extradural defects are seen. No   abnormal enhancement is seen.  MISCELLANEOUS:  None.    IMPRESSION:  Multilevel cervical and lumbar spondylosis with associated foraminal   stenosis.    C5-C6 and C6-C7 mild spinal stenosis with mild cord flattening.    L3-L4 right paracentral extruded disc herniation and L4-L5 central disc   protrusion as described above.      < end of copied text >    Imaging Personally Reviewed:    Consultant(s) Notes Reviewed:  Neurosx     Care Discussed with Consultants/Other Providers:

## 2022-02-16 NOTE — PROGRESS NOTE ADULT - PROBLEM SELECTOR PLAN 4
- DVT PPx: SCD's for now given recent procedure   - Case and plan discussed with Dr. Ayala - DVT PPx: SCD's for now given recent procedure   - DC home today ,  - Patient hemodynamically stable for discharge home  - Time spent in discharge process is 40 min  -plan of care was d/w patient and ACP

## 2022-02-16 NOTE — DISCHARGE NOTE PROVIDER - NSDCMRMEDTOKEN_GEN_ALL_CORE_FT
gabapentin 300 mg oral capsule: 1 cap(s) orally 3 times a day  gabapentin 600 mg oral tablet: Patient reports being told to take this in addition to his 300mg cap, but he tried it and it makes him dizzy and no longer takes   ibuprofen 600 mg oral tablet: 1 tab(s) orally 3 times a day (with meals), As Needed  lidocaine topical: Uses topical emulsion of lidocaine, dicolefenac, gabapentin, baclofen, and doxepin he gets at compounding pharmacy    acetaminophen 325 mg oral tablet: 2 tab(s) orally every 6 hours, As needed, Mild Pain (1 - 3)  gabapentin 300 mg oral capsule: 1 cap(s) orally 3 times a day  Outpatient physical therapy for gait and strength training : Outpatient physical therapy for gait and strength training   acetaminophen 325 mg oral tablet: 2 tab(s) orally every 6 hours, As needed, Mild Pain (1 - 3)  gabapentin 300 mg oral capsule: 1 cap(s) orally 3 times a day  Medrol Dosepak 4 mg oral tablet: MEDROL DOSE YOSELIN AS PER INSTRUCTIONS ON PACKAGE   Outpatient physical therapy for gait and strength training : Outpatient physical therapy for gait and strength training  pantoprazole 40 mg oral delayed release tablet: 1 tab(s) orally once a day (before a meal)

## 2022-02-16 NOTE — DISCHARGE NOTE NURSING/CASE MANAGEMENT/SOCIAL WORK - NSDCPEWEB_GEN_ALL_CORE
[facility] for Tobacco Control website --- [email or web address] --- [email or web address]

## 2024-10-14 NOTE — PHYSICAL THERAPY INITIAL EVALUATION ADULT - SIT-TO-STAND BALANCE
Minicog score of 2,  has noted some memory changes  Will send to neuropsychology for further evaluation  Recent TSH wnl  Discussed importance of active mental engagement   good balance